# Patient Record
Sex: MALE | Race: WHITE | URBAN - METROPOLITAN AREA
[De-identification: names, ages, dates, MRNs, and addresses within clinical notes are randomized per-mention and may not be internally consistent; named-entity substitution may affect disease eponyms.]

---

## 2017-05-11 ENCOUNTER — IMPORTED ENCOUNTER (OUTPATIENT)
Dept: URBAN - METROPOLITAN AREA CLINIC 38 | Facility: CLINIC | Age: 82
End: 2017-05-11

## 2017-05-11 PROBLEM — E11.9 TYPE II DIABETES WITHOUT COMPLICATIONS: Noted: 2017-05-11

## 2017-05-11 PROBLEM — H26.493 POSTERIOR CAPSULE OPACITY -     OU: Noted: 2017-05-11

## 2017-05-11 PROBLEM — H35.3131 NONEXUDATIVE AGE-RELATED MACULAR DEGENERATION, BILATERAL, EARLY DRY STAGE: Noted: 2017-05-11

## 2017-05-11 PROCEDURE — 92014 COMPRE OPH EXAM EST PT 1/>: CPT

## 2017-05-11 PROCEDURE — 92015 DETERMINE REFRACTIVE STATE: CPT

## 2018-05-29 ENCOUNTER — IMPORTED ENCOUNTER (OUTPATIENT)
Dept: URBAN - METROPOLITAN AREA CLINIC 38 | Facility: CLINIC | Age: 83
End: 2018-05-29

## 2018-05-29 PROBLEM — H26.493 POSTERIOR CAPSULE OPACITY -     OU: Noted: 2018-05-29

## 2018-05-29 PROBLEM — E11.9 TYPE II DIABETES WITHOUT COMPLICATIONS: Noted: 2018-05-29

## 2018-05-29 PROBLEM — H35.3131 NONEXUDATIVE AGE-RELATED MACULAR DEGENERATION, BILATERAL, EARLY DRY STAGE: Noted: 2018-05-29

## 2018-05-29 PROCEDURE — 92015 DETERMINE REFRACTIVE STATE: CPT

## 2018-05-29 PROCEDURE — 92014 COMPRE OPH EXAM EST PT 1/>: CPT

## 2019-05-28 ENCOUNTER — IMPORTED ENCOUNTER (OUTPATIENT)
Dept: URBAN - METROPOLITAN AREA CLINIC 38 | Facility: CLINIC | Age: 84
End: 2019-05-28

## 2019-05-28 PROBLEM — E11.9 TYPE II DIABETES WITHOUT COMPLICATIONS: Noted: 2019-05-28

## 2019-05-28 PROBLEM — H26.493 POSTERIOR CAPSULE OPACITY -     OU: Noted: 2019-05-28

## 2019-05-28 PROBLEM — H35.3131 NONEXUDATIVE AGE-RELATED MACULAR DEGENERATION, BILATERAL, EARLY DRY STAGE: Noted: 2019-05-28

## 2019-05-28 PROCEDURE — 92014 COMPRE OPH EXAM EST PT 1/>: CPT

## 2019-05-28 PROCEDURE — 92015 DETERMINE REFRACTIVE STATE: CPT

## 2019-12-06 ENCOUNTER — IMPORTED ENCOUNTER (OUTPATIENT)
Dept: URBAN - METROPOLITAN AREA CLINIC 38 | Facility: CLINIC | Age: 84
End: 2019-12-06

## 2019-12-06 PROBLEM — H26.493 POSTERIOR CAPSULE OPACITY -     OU: Noted: 2019-12-06

## 2019-12-06 PROBLEM — H35.3131 NONEXUDATIVE AGE-RELATED MACULAR DEGENERATION, BILATERAL, EARLY DRY STAGE: Noted: 2019-12-06

## 2019-12-06 PROCEDURE — 92015 DETERMINE REFRACTIVE STATE: CPT

## 2019-12-06 PROCEDURE — 92012 INTRM OPH EXAM EST PATIENT: CPT

## 2020-01-10 ENCOUNTER — IMPORTED ENCOUNTER (OUTPATIENT)
Dept: URBAN - METROPOLITAN AREA CLINIC 38 | Facility: CLINIC | Age: 85
End: 2020-01-10

## 2020-01-10 PROBLEM — E11.9 TYPE II DIABETES WITHOUT COMPLICATIONS: Noted: 2020-01-10

## 2020-01-10 PROBLEM — H35.371 PUCKERING OF MACULA, RIGHT EYE: Noted: 2020-01-10

## 2020-01-10 PROBLEM — H35.3132 NONEXUDATIVE AGE-RELATED MACULAR DEGENERATION, BILATERAL, INTERMEDIATE DRY STAGE: Noted: 2020-01-10

## 2020-01-10 PROCEDURE — 92202 OPSCPY EXTND ON/MAC DRAW: CPT

## 2020-01-10 PROCEDURE — 99244 OFF/OP CNSLTJ NEW/EST MOD 40: CPT

## 2021-05-28 ENCOUNTER — OFFICE VISIT (OUTPATIENT)
Dept: PODIATRY | Facility: CLINIC | Age: 86
End: 2021-05-28
Payer: COMMERCIAL

## 2021-05-28 VITALS
HEIGHT: 76 IN | DIASTOLIC BLOOD PRESSURE: 57 MMHG | WEIGHT: 214 LBS | BODY MASS INDEX: 26.06 KG/M2 | RESPIRATION RATE: 16 BRPM | HEART RATE: 78 BPM | SYSTOLIC BLOOD PRESSURE: 83 MMHG

## 2021-05-28 DIAGNOSIS — L97.422 CHRONIC ULCER OF PLANTAR SURFACE OF MIDFOOT, LEFT, WITH FAT LAYER EXPOSED (HCC): Primary | ICD-10-CM

## 2021-05-28 PROCEDURE — 99203 OFFICE O/P NEW LOW 30 MIN: CPT | Performed by: PODIATRIST

## 2021-06-10 ENCOUNTER — OFFICE VISIT (OUTPATIENT)
Dept: PODIATRY | Facility: CLINIC | Age: 86
End: 2021-06-10
Payer: MEDICARE

## 2021-06-10 VITALS — BODY MASS INDEX: 26.06 KG/M2 | RESPIRATION RATE: 16 BRPM | WEIGHT: 214 LBS | HEIGHT: 76 IN

## 2021-06-10 DIAGNOSIS — L97.422 CHRONIC ULCER OF PLANTAR SURFACE OF MIDFOOT, LEFT, WITH FAT LAYER EXPOSED (HCC): Primary | ICD-10-CM

## 2021-06-10 PROCEDURE — 99213 OFFICE O/P EST LOW 20 MIN: CPT | Performed by: PODIATRIST

## 2021-08-02 ENCOUNTER — IMPORTED ENCOUNTER (OUTPATIENT)
Dept: URBAN - METROPOLITAN AREA CLINIC 38 | Facility: CLINIC | Age: 86
End: 2021-08-02

## 2021-08-02 PROBLEM — H35.3132 DRY AMD, INTERMEDIATE DRY STAGE: Noted: 2021-08-02

## 2021-08-02 PROBLEM — E11.9 TYPE II DIABETES WITHOUT COMPLICATIONS: Noted: 2021-08-02

## 2021-08-02 PROBLEM — H35.3132 NONEXUDATIVE AGE-RELATED MACULAR DEGENERATION, BILATERAL, INTERMEDIATE DRY STAGE: Noted: 2021-08-02

## 2021-08-02 PROBLEM — H26.493 POSTERIOR CAPSULE OPACITY -     OU: Noted: 2021-08-02

## 2021-08-02 PROBLEM — H26.493 POSTERIOR CAPSULE OPACITY: Noted: 2021-08-02

## 2021-08-02 PROCEDURE — 92015 DETERMINE REFRACTIVE STATE: CPT

## 2021-08-02 PROCEDURE — 92014 COMPRE OPH EXAM EST PT 1/>: CPT

## 2021-08-25 NOTE — PROGRESS NOTES
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, left, with fat layer exposed ([address] )  -     Ambulatory referral to Wound Care; Future  -     cadexomer iodine (IODOSORB) 0 9 % gel; Apply 1 application topically daily as needed for wound care          Subjective:      Patient ID: Michelle Thompson is a 82 y o  male  82-year-old male presents to the office for the management of foot ulcer, patient had underwent surgical amputation with 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 at [address] of the [address] [place] [address], patient is currently reside with his family for rehab, patient is unable to follow-up with his surgeon at this time, patient denies constitutional symptoms, patient denies trauma to the foot, patient state compliance with dressing changes to the foot which include dry sterile dressing and monitoring signs of infection, patient denies pain to the surgical site, patient had a recent vascular intervention      The following portions of the patient's history were reviewed and updated as appropriate: allergies, current medications, past family history, past medical history, past social history, past surgical history and problem list     Review of Systems   Constitutional: Negative  Respiratory: Negative  Cardiovascular: Positive for leg swelling  Musculoskeletal: Positive for arthralgias  Skin: Positive for color change and wound                 Historical Information   No past medical history on file  No past surgical history on file  Social History   Social History     Substance and Sexual Activity   Alcohol Use Not on file     Social History     Substance and Sexual Activity   Drug Use Not on file     Social History     Tobacco Use   Smoking Status Not on file     Family History: No family history on file  Meds/Allergies   all medications and allergies reviewed  No Known Allergies    Objective:      BP (!) 83/57   Pulse 78   Resp 16   Ht 6' 4" (1 93 m)   Wt 97 1 kg (214 lb)   BMI 26 05 kg/m²          Physical Exam  Constitutional:       General: He is not in acute distress  Appearance: He is well-developed  He is not ill-appearing, toxic-appearing or diaphoretic  HENT:      Head: Normocephalic  Cardiovascular:      Pulses:           Dorsalis pedis pulses are 1+ on the right side and 1+ on the left side  Posterior tibial pulses are 0 on the right side and 0 on the left side  Comments: Palpable DP pulse, nonpalpable PT pulse, CFT is less than 3 seconds, temperature gradient within normal limit, a trophic skin changes noted with skin thinning in shiny, patient report occasional claudication to bilateral calf, localized edema foot and ankle Q9  Pulmonary:      Effort: Pulmonary effort is normal    Musculoskeletal:         General: Tenderness and deformity present  Comments: Pes planus type foot pain on palpation and range of motion of the 1st MPJ, metatarsal heads bilateral foot, pain on palpation on range of motion of the ST joint, crepitus noted in midfoot joint  Feet:      Comments: 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,    Recent amputation to the 5th ray on the left,  Skin:     General: Skin is dry  Capillary Refill: Capillary refill takes 2 to 3 seconds        Comments: Trophic skin changes bilateral foot and ankle, alternating hypopigmentation and hyperpigmentation patches around the foot and ankle on anterior leg, skin is shiny and thin, absent hair growth, atrophy of fat pad  Healed amputation site at the left hallux,    Surgical site at the 5th ray amputation is clean, coapted sutures intact,    Ulceration noted plantar left foot, with retention sutures applied, no drainage no redness no signs of infection    Neurological:      Mental Status: He is alert and oriented to person, place, and time  Sensory: Sensory deficit present  Motor: Atrophy present        Deep Tendon Reflexes: Reflexes abnormal       Foot Exam    Right Foot/Ankle     Neurovascular  Dorsalis pedis: 1+  Posterior tibial: 0      Left Foot/Ankle      Neurovascular  Dorsalis pedis: 1+  Posterior tibial: 0

## 2022-06-04 ASSESSMENT — VISUAL ACUITY
OS_CC: 20/30-1
OD_CC: 20/60+1
OS_CC: 20/30-1
OS_CC: J4
OD_CC: J4
OS_BAT: <20/400
OD_BAT: 20/400
OS_CC: J5
OD_CC: J6
OD_CC: 20/200
OD_CC: 20/200
OD_CC: 20/30
OD_CC: J7
OS_CC: 20/25
OS_CC: J6
OS_CC: 20/25
OD_CC: 20/100
OD_BAT: <20/400
OS_CC: 20/50
OS_CC: J4
OD_CC: 20/25-1
OS_CC: J5
OD_CC: J7
OS_CC: 20/25-1
OD_CC: J7
OS_BAT: 20/50

## 2022-06-04 ASSESSMENT — KERATOMETRY
OS_K2POWER_DIOPTERS: 44.00
OS_K1POWER_DIOPTERS: 43.75
OS_AXISANGLE2_DEGREES: 127
OD_AXISANGLE2_DEGREES: 20
OS_K2POWER_DIOPTERS: 44.25
OS_AXISANGLE_DEGREES: 5
OS_AXISANGLE_DEGREES: 180
OD_K2POWER_DIOPTERS: 43.50
OS_K1POWER_DIOPTERS: 43.75
OS_AXISANGLE_DEGREES: 78
OD_AXISANGLE_DEGREES: 10
OS_K2POWER_DIOPTERS: 43.75
OD_K2POWER_DIOPTERS: 44.25
OD_K1POWER_DIOPTERS: 44.00
OS_AXISANGLE2_DEGREES: 95
OD_AXISANGLE2_DEGREES: 100
OD_AXISANGLE_DEGREES: 16
OS_K2POWER_DIOPTERS: 44.50
OS_AXISANGLE2_DEGREES: 168
OS_AXISANGLE_DEGREES: 163
OD_AXISANGLE2_DEGREES: 98
OD_AXISANGLE_DEGREES: 8
OD_K1POWER_DIOPTERS: 44.00
OS_K1POWER_DIOPTERS: 43.75
OS_AXISANGLE_DEGREES: 37
OD_AXISANGLE2_DEGREES: 106
OD_K1POWER_DIOPTERS: 44.00
OD_AXISANGLE_DEGREES: 110
OS_K1POWER_DIOPTERS: 43.75
OS_AXISANGLE2_DEGREES: 90
OS_AXISANGLE2_DEGREES: 73
OD_K2POWER_DIOPTERS: 43.50
OD_K2POWER_DIOPTERS: 43.25
OD_AXISANGLE2_DEGREES: 12
OS_K2POWER_DIOPTERS: 43.75
OS_K1POWER_DIOPTERS: 44.00
OD_K1POWER_DIOPTERS: 43.25
OD_K1POWER_DIOPTERS: 43.25
OD_AXISANGLE_DEGREES: 102
OD_K2POWER_DIOPTERS: 44.00

## 2022-06-04 ASSESSMENT — TONOMETRY
OD_IOP_MMHG: 10
OD_IOP_MMHG: 10
OD_IOP_MMHG: 11
OS_IOP_MMHG: 12
OD_IOP_MMHG: 14
OS_IOP_MMHG: 11
OS_IOP_MMHG: 11
OD_IOP_MMHG: 12
OS_IOP_MMHG: 9
OS_IOP_MMHG: 11
OD_IOP_MMHG: 14
OS_IOP_MMHG: 12

## 2022-11-23 ENCOUNTER — ESTABLISHED COMPREHENSIVE EXAM (OUTPATIENT)
Dept: URBAN - METROPOLITAN AREA CLINIC 68 | Facility: CLINIC | Age: 87
End: 2022-11-23

## 2022-11-23 DIAGNOSIS — E11.9: ICD-10-CM

## 2022-11-23 DIAGNOSIS — H35.3132: ICD-10-CM

## 2022-11-23 DIAGNOSIS — H26.493: ICD-10-CM

## 2022-11-23 PROCEDURE — 92015 DETERMINE REFRACTIVE STATE: CPT

## 2022-11-23 PROCEDURE — 92134 CPTRZ OPH DX IMG PST SGM RTA: CPT

## 2022-11-23 PROCEDURE — 92014 COMPRE OPH EXAM EST PT 1/>: CPT

## 2022-11-23 ASSESSMENT — VISUAL ACUITY
OD_CC: J7
OS_GLARE: 20/200
OD_GLARE: 20/400
OS_CC: J7
OS_PH: 20/40
OS_CC: 20/50
OD_PH: 20/80
OD_CC: 20/100+2

## 2022-11-23 ASSESSMENT — KERATOMETRY
OD_K1POWER_DIOPTERS: 43.25
OD_AXISANGLE2_DEGREES: 12
OS_AXISANGLE_DEGREES: 78
OD_K2POWER_DIOPTERS: 44.25
OS_K2POWER_DIOPTERS: 44.00
OS_K1POWER_DIOPTERS: 43.75
OS_AXISANGLE2_DEGREES: 168
OD_AXISANGLE_DEGREES: 102

## 2022-11-23 ASSESSMENT — TONOMETRY
OS_IOP_MMHG: 10
OD_IOP_MMHG: 9